# Patient Record
Sex: MALE
[De-identification: names, ages, dates, MRNs, and addresses within clinical notes are randomized per-mention and may not be internally consistent; named-entity substitution may affect disease eponyms.]

---

## 2022-01-25 PROBLEM — Z00.00 ENCOUNTER FOR PREVENTIVE HEALTH EXAMINATION: Status: ACTIVE | Noted: 2022-01-25

## 2022-01-27 ENCOUNTER — APPOINTMENT (OUTPATIENT)
Dept: ORTHOPEDIC SURGERY | Facility: CLINIC | Age: 57
End: 2022-01-27
Payer: COMMERCIAL

## 2022-01-27 VITALS — BODY MASS INDEX: 28.93 KG/M2 | HEIGHT: 66 IN | WEIGHT: 180 LBS

## 2022-01-27 DIAGNOSIS — M25.572 PAIN IN RIGHT ANKLE AND JOINTS OF RIGHT FOOT: ICD-10-CM

## 2022-01-27 DIAGNOSIS — M21.40 FLAT FOOT [PES PLANUS] (ACQUIRED), UNSPECIFIED FOOT: ICD-10-CM

## 2022-01-27 DIAGNOSIS — M25.571 PAIN IN RIGHT ANKLE AND JOINTS OF RIGHT FOOT: ICD-10-CM

## 2022-01-27 DIAGNOSIS — M25.879 OTHER SPECIFIED JOINT DISORDERS, UNSPECIFIED ANKLE AND FOOT: ICD-10-CM

## 2022-01-27 PROCEDURE — 99203 OFFICE O/P NEW LOW 30 MIN: CPT

## 2022-01-27 PROCEDURE — 73610 X-RAY EXAM OF ANKLE: CPT | Mod: 50

## 2022-01-31 PROBLEM — M21.40 PES PLANUS: Status: ACTIVE | Noted: 2022-01-31

## 2022-01-31 PROBLEM — M25.879 ANKLE IMPINGEMENT SYNDROME: Status: ACTIVE | Noted: 2022-01-31

## 2022-01-31 NOTE — ASSESSMENT
[FreeTextEntry1] : Discussed at length with patient underlying ankle alignment and symptoms c/w anterior ankle impingement.  Patient recommended for arch supports and if no improvement consideration to MRI.

## 2022-01-31 NOTE — PHYSICAL EXAM
[de-identified] : Bilateral ankle:\par \par Constitutional: \par The patient is healthy-appearing and in no apparent distress. \par \par Gait and Station: \par The patient ambulates with a normal gait and no significant limp. \par \par Right Ankle:\par \par Cardiovascular System: \par Ther capillary refill is less than 2 seconds. \par \par Skin: \par There are no skin abnormalities of ankle.\par \par Ankles and Feet: \par Inspection: \par There is no erythema, induration, warmth.\par There is pes pnaus and heel valgus deformity.    \par \par Bony Palpation: \par There is no tenderness of the calcaneal tuberosity.\par There is no tenderness of the metatarsals.\par There is no tenderness of the tarsometatarsal joints.\par There is no tenderness of the navicular tuberosity.\par There is no tenderenss of the dome of talus.\par There is no tenderness of the head of talus.\par There is no tenderness of the inferior tibiofibular joint.\par There is mild tenderness of the anterior ankle.\par \par Soft Tissue Palpation: \par There is no tenderness of the tibialis posterior.\par There is no tenderness of the tibialis anterior.\par There is no tenderness of the plantar fascia.\par There is no tenderness of the Achilles tendon.\par There is no tenderness of the extensor hallucis longus.\par There is no tenderness of the sinus tarsi. \par There is no tenderness of the peroneus longus and brevis.\par There is no tenderness of the deltoid ligament.   \par There is tenderness of the anterior talofibular ligament.\par There is tenderness of the calcaneofibular ligament. \par \par Active Range of Motion: \par The range of motion at the ankle is full. \par \par Stability: \par The anterior drawer is negative. \par \par Strength: \par There is 5/5 ankle plantarflexion and dorsiflexion.\par \par Left Ankle:\par \par Cardiovascular System: \par Ther capillary refill is less than 2 seconds. \par \par Skin: \par There are no skin abnormalities of ankle.\par \par Ankles and Feet: \par Inspection: \par There is no erythema, induration, warmth.\par There is pes pnaus and heel valgus deformity.  \par \par Bony Palpation: \par There is no tenderness of the calcaneal tuberosity.\par There is no tenderness of the metatarsals.\par There is no tenderness of the tarsometatarsal joints.\par There is no tenderness of the navicular tuberosity.\par There is no tenderenss of the dome of talus.\par There is no tenderness of the head of talus.\par There is no tenderness of the inferior tibiofibular joint.\par There is mild tenderess of the anterior ankle.\par \par Soft Tissue Palpation: \par There is no tenderness of the tibialis posterior.\par There is no tenderness of the tibialis anterior.\par There is no tenderness of the plantar fascia.\par There is no tenderness of the Achilles tendon.\par There is no tenderness of the extensor hallucis longus.\par There is no tenderness of the sinus tarsi. \par There is no tenderness of the peroneus longus and brevis.\par There is no tenderness of the deltoid ligament.   \par There is tenderness of the anterior talofibular ligament.\par There is tenderness of the calcaneofibular ligament. \par \par Active Range of Motion: \par The range of motion at the ankle is full. \par \par Stability: \par The anterior drawer is negative. \par \par Strength: \par There is 5/5 ankle plantarflexion and dorsiflexion.\par \par Neurological System: \par There is normal sensation to light touch at the ankle and foot. \par \par Psychiatric: \par The patient demonstrates a normal mood and affect and is active and alert. [de-identified] : Given patient's reported history and physical examination, x-ray evaluation ( as listed below ) was ordered and performed to aid in diagnosis and treatment of the patient.\par X-ray bilateral ankle.  There is no significant bony / soft tissue abnormality, arthritis, or fracture except minial distal tibial spurring

## 2022-01-31 NOTE — HISTORY OF PRESENT ILLNESS
[de-identified] : Location: Bilateral ankles (R > L)- anterior\par Duration: 2 months but states several years baseline\par Context: atraumatic\par Quality: sharp\par Aggravating factors: running, yoga\par Associated symptoms: N/A\par Conservative treatment: rest, ice, home exercises \par Prior studies: N/A